# Patient Record
Sex: FEMALE | Race: WHITE | NOT HISPANIC OR LATINO
[De-identification: names, ages, dates, MRNs, and addresses within clinical notes are randomized per-mention and may not be internally consistent; named-entity substitution may affect disease eponyms.]

---

## 2017-09-12 ENCOUNTER — TRANSCRIPTION ENCOUNTER (OUTPATIENT)
Age: 57
End: 2017-09-12

## 2019-07-26 PROBLEM — Z00.00 ENCOUNTER FOR PREVENTIVE HEALTH EXAMINATION: Status: ACTIVE | Noted: 2019-07-26

## 2019-08-21 ENCOUNTER — APPOINTMENT (OUTPATIENT)
Dept: PAIN MANAGEMENT | Facility: CLINIC | Age: 59
End: 2019-08-21
Payer: COMMERCIAL

## 2019-08-21 VITALS
DIASTOLIC BLOOD PRESSURE: 72 MMHG | HEART RATE: 75 BPM | BODY MASS INDEX: 33.13 KG/M2 | SYSTOLIC BLOOD PRESSURE: 107 MMHG | HEIGHT: 62 IN | WEIGHT: 180 LBS

## 2019-08-21 DIAGNOSIS — R51 HEADACHE: ICD-10-CM

## 2019-08-21 PROCEDURE — 99244 OFF/OP CNSLTJ NEW/EST MOD 40: CPT

## 2019-08-21 RX ORDER — METHYLPREDNISOLONE 4 MG/1
4 TABLET ORAL
Qty: 21 | Refills: 0 | Status: ACTIVE | COMMUNITY
Start: 2019-08-21 | End: 1900-01-01

## 2019-08-29 RX ORDER — DEXAMETHASONE 2 MG/1
2 TABLET ORAL
Qty: 6 | Refills: 0 | Status: ACTIVE | COMMUNITY
Start: 2019-08-29 | End: 1900-01-01

## 2019-08-29 NOTE — PHYSICAL EXAM
[General Appearance - Alert] : alert [General Appearance - In No Acute Distress] : in no acute distress [Person] : oriented to person [Time] : oriented to time [Place] : oriented to place [Concentration Intact] : normal concentrating ability [Visual Intact] : visual attention was ~T not ~L decreased [Fluency] : fluency intact [Comprehension] : comprehension intact [Cranial Nerves Optic (II)] : visual acuity intact bilaterally,  visual fields full to confrontation, pupils equal round and reactive to light [Past History] : adequate knowledge of personal past history [Cranial Nerves Oculomotor (III)] : extraocular motion intact [Cranial Nerves Trigeminal (V)] : facial sensation intact symmetrically [Cranial Nerves Glossopharyngeal (IX)] : tongue and palate midline [Cranial Nerves Facial (VII)] : face symmetrical [Cranial Nerves Vestibulocochlear (VIII)] : hearing was intact bilaterally [Cranial Nerves Accessory (XI - Cranial And Spinal)] : head turning and shoulder shrug symmetric [Cranial Nerves Hypoglossal (XII)] : there was no tongue deviation with protrusion [Motor Tone] : muscle tone was normal in all four extremities [Motor Strength] : muscle strength was normal in all four extremities [No Muscle Atrophy] : normal bulk in all four extremities [Abnormal Walk] : normal gait [Sensation Tactile Decrease] : light touch was intact [Balance] : balance was intact [2+] : Ankle jerk left 2+ [Outer Ear] : the ears and nose were normal in appearance [Sclera] : the sclera and conjunctiva were normal [Neck Appearance] : the appearance of the neck was normal [] : no rash [Past-pointing] : there was no past-pointing [Tremor] : no tremor present [Plantar Reflex Right Only] : normal on the right [Plantar Reflex Left Only] : normal on the left [FreeTextEntry1] : cervical paraspinal tenderness.

## 2019-08-29 NOTE — REVIEW OF SYSTEMS
[As Noted in HPI] : as noted in HPI [Negative] : Heme/Lymph [FreeTextEntry2] : No signs of toxicity.

## 2019-08-29 NOTE — PHYSICAL EXAM
[General Appearance - Alert] : alert [General Appearance - In No Acute Distress] : in no acute distress [Person] : oriented to person [Place] : oriented to place [Time] : oriented to time [Visual Intact] : visual attention was ~T not ~L decreased [Concentration Intact] : normal concentrating ability [Comprehension] : comprehension intact [Fluency] : fluency intact [Past History] : adequate knowledge of personal past history [Cranial Nerves Optic (II)] : visual acuity intact bilaterally,  visual fields full to confrontation, pupils equal round and reactive to light [Cranial Nerves Oculomotor (III)] : extraocular motion intact [Cranial Nerves Trigeminal (V)] : facial sensation intact symmetrically [Cranial Nerves Glossopharyngeal (IX)] : tongue and palate midline [Cranial Nerves Facial (VII)] : face symmetrical [Cranial Nerves Vestibulocochlear (VIII)] : hearing was intact bilaterally [Cranial Nerves Hypoglossal (XII)] : there was no tongue deviation with protrusion [Cranial Nerves Accessory (XI - Cranial And Spinal)] : head turning and shoulder shrug symmetric [No Muscle Atrophy] : normal bulk in all four extremities [Motor Strength] : muscle strength was normal in all four extremities [Motor Tone] : muscle tone was normal in all four extremities [Sensation Tactile Decrease] : light touch was intact [Balance] : balance was intact [Abnormal Walk] : normal gait [2+] : Ankle jerk left 2+ [Sclera] : the sclera and conjunctiva were normal [Outer Ear] : the ears and nose were normal in appearance [Neck Appearance] : the appearance of the neck was normal [] : no rash [Past-pointing] : there was no past-pointing [Tremor] : no tremor present [Plantar Reflex Right Only] : normal on the right [Plantar Reflex Left Only] : normal on the left [FreeTextEntry1] : cervical paraspinal tenderness.

## 2019-08-29 NOTE — HISTORY OF PRESENT ILLNESS
[Chronic Headache] : chronic headache [Dizziness] : dizziness [Nausea] : nausea [Photophobia] : photophobia [Neck Pain] : neck pain [Phonophobia] : phonophobia [< 4 hours] : < 4 hours [stayed the same] : stayed the same [FreeTextEntry1] : 58 y/o female presenting with 3 year ho chronic daily headaches. She reports the headaches as "building up in front of head increasing as the day goes on. By end of day pain the pain is intolerable. The pain lasts about 3-60 minutes rarely associated with nausea, photophobia and phonophobia. She also co high pitched tinnitus No LP. ENT eval - CT neg; Neuro - MRI brain w and wo contrast ess neg. The headache is ? positional but it takes her more than 30 minutes to resolve upon lying flat in bed. \par \par Treatment: Currently none. Rx 'd topamax but felt uncomfortable and did not take it. \par \par Patient recalls having a significant stressor around onset of headaches 3 years ago. Sons business fell apart; other son heroin addict. Two years ago, sister dies; Mother is old and almost dead. She has been more withdrawn due to these headaches. \par \par She also recalls having had multiple concussions last one being about 4.5 years ago. \par \par Sleep is disturbed; diet is fair but does not have much appetite; has put on 15 pounds; Cant get things done. Having more anxiety.  \par \par \par Linda Molina- [phone] [Aura] : no aura [Vomiting] : no Vomiting [Scotoma] : no scotoma [Numbness] : no numbness [Tingling] : no tingling [Weakness] : no weakness [Scalp Tenderness] : no scalp tenderness

## 2019-08-29 NOTE — HISTORY OF PRESENT ILLNESS
[Chronic Headache] : chronic headache [Dizziness] : dizziness [Nausea] : nausea [Photophobia] : photophobia [Phonophobia] : phonophobia [Neck Pain] : neck pain [< 4 hours] : < 4 hours [stayed the same] : stayed the same [FreeTextEntry1] : 60 y/o female presenting with 3 year ho chronic daily headaches. She reports the headaches as "building up in front of head increasing as the day goes on. By end of day pain the pain is intolerable. The pain lasts about 3-60 minutes rarely associated with nausea, photophobia and phonophobia. She also co high pitched tinnitus No LP. ENT eval - CT neg; Neuro - MRI brain w and wo contrast ess neg. The headache is ? positional but it takes her more than 30 minutes to resolve upon lying flat in bed. \par \par Treatment: Currently none. Rx 'd topamax but felt uncomfortable and did not take it. \par \par Patient recalls having a significant stressor around onset of headaches 3 years ago. Sons business fell apart; other son heroin addict. Two years ago, sister dies; Mother is old and almost dead. She has been more withdrawn due to these headaches. \par \par She also recalls having had multiple concussions last one being about 4.5 years ago. \par \par Sleep is disturbed; diet is fair but does not have much appetite; has put on 15 pounds; Cant get things done. Having more anxiety.  \par \par \par Linda Molina- [phone] [Aura] : no aura [Vomiting] : no Vomiting [Scotoma] : no scotoma [Tingling] : no tingling [Numbness] : no numbness [Weakness] : no weakness [Scalp Tenderness] : no scalp tenderness

## 2019-08-29 NOTE — ASSESSMENT
[FreeTextEntry1] : Possible chronic migraine\par Possible Low Pressure Headaches as per patients report\par Cervicalgia\par \par I explained in detail that, in my opinion, ther headaches represent chronic migraine. I do agree there is a possible component but not classical for LPH. Her neuro exam is non focal and her MRI brain and c spine are essentially negative. We discussed treating it as a migraine but she prefers to ro LPH. I advised her the MRI brain neg but we could do a LP. She prefers to get LP first prior to any chronic treatment. I also provided her a Rx Medrol dose ayad. Advised of potential risks and benefits from this agent.

## 2019-12-12 ENCOUNTER — APPOINTMENT (OUTPATIENT)
Dept: PAIN MANAGEMENT | Facility: CLINIC | Age: 59
End: 2019-12-12
Payer: COMMERCIAL

## 2019-12-12 VITALS
DIASTOLIC BLOOD PRESSURE: 78 MMHG | SYSTOLIC BLOOD PRESSURE: 123 MMHG | WEIGHT: 180 LBS | HEIGHT: 62 IN | HEART RATE: 84 BPM | BODY MASS INDEX: 33.13 KG/M2

## 2019-12-12 PROCEDURE — 99214 OFFICE O/P EST MOD 30 MIN: CPT

## 2019-12-20 NOTE — ASSESSMENT
[FreeTextEntry1] : Possible chronic migraine\par Possible Low Pressure Headaches as per patients report\par Cervicalgia\par \par Agree with BOTOX trial \par Despite most probably migraine I do agree there is a possible component but not classical for LPH.\par \par

## 2019-12-20 NOTE — PHYSICAL EXAM
[General Appearance - In No Acute Distress] : in no acute distress [General Appearance - Alert] : alert [Person] : oriented to person [Concentration Intact] : normal concentrating ability [Time] : oriented to time [Place] : oriented to place [Fluency] : fluency intact [Visual Intact] : visual attention was ~T not ~L decreased [Comprehension] : comprehension intact [Past History] : adequate knowledge of personal past history [Cranial Nerves Optic (II)] : visual acuity intact bilaterally,  visual fields full to confrontation, pupils equal round and reactive to light [Cranial Nerves Oculomotor (III)] : extraocular motion intact [Cranial Nerves Facial (VII)] : face symmetrical [Cranial Nerves Vestibulocochlear (VIII)] : hearing was intact bilaterally [Cranial Nerves Trigeminal (V)] : facial sensation intact symmetrically [Cranial Nerves Accessory (XI - Cranial And Spinal)] : head turning and shoulder shrug symmetric [Cranial Nerves Glossopharyngeal (IX)] : tongue and palate midline [Cranial Nerves Hypoglossal (XII)] : there was no tongue deviation with protrusion [Motor Tone] : muscle tone was normal in all four extremities [Motor Strength] : muscle strength was normal in all four extremities [Sensation Tactile Decrease] : light touch was intact [No Muscle Atrophy] : normal bulk in all four extremities [Abnormal Walk] : normal gait [Balance] : balance was intact [2+] : Ankle jerk left 2+ [Sclera] : the sclera and conjunctiva were normal [Outer Ear] : the ears and nose were normal in appearance [Neck Appearance] : the appearance of the neck was normal [] : no rash [Past-pointing] : there was no past-pointing [Tremor] : no tremor present [Plantar Reflex Right Only] : normal on the right [Plantar Reflex Left Only] : normal on the left [FreeTextEntry1] : cervical paraspinal tenderness.

## 2019-12-20 NOTE — HISTORY OF PRESENT ILLNESS
[FreeTextEntry1] : Since last visit, patient did not have blood patch LP due to logistics. She is awaiting for BOTOX appproval with Dr. Molina. The headaches have shifted lower but continues to co positional headache almost gone upon lying supine and worse upon standing..

## 2020-01-27 ENCOUNTER — APPOINTMENT (OUTPATIENT)
Dept: PAIN MANAGEMENT | Facility: CLINIC | Age: 60
End: 2020-01-27

## 2022-06-13 ENCOUNTER — LABORATORY RESULT (OUTPATIENT)
Age: 62
End: 2022-06-13

## 2022-06-14 ENCOUNTER — APPOINTMENT (OUTPATIENT)
Dept: DERMATOLOGY | Facility: CLINIC | Age: 62
End: 2022-06-14
Payer: COMMERCIAL

## 2022-06-14 DIAGNOSIS — Z12.83 ENCOUNTER FOR SCREENING FOR MALIGNANT NEOPLASM OF SKIN: ICD-10-CM

## 2022-06-14 DIAGNOSIS — D48.7 NEOPLASM OF UNCERTAIN BEHAVIOR OF OTHER SPECIFIED SITES: ICD-10-CM

## 2022-06-14 DIAGNOSIS — D48.5 NEOPLASM OF UNCERTAIN BEHAVIOR OF SKIN: ICD-10-CM

## 2022-06-14 DIAGNOSIS — D22.9 MELANOCYTIC NEVI, UNSPECIFIED: ICD-10-CM

## 2022-06-14 PROCEDURE — 99203 OFFICE O/P NEW LOW 30 MIN: CPT | Mod: 25

## 2022-06-14 PROCEDURE — 11102 TANGNTL BX SKIN SINGLE LES: CPT

## 2022-06-16 ENCOUNTER — APPOINTMENT (OUTPATIENT)
Dept: DERMATOLOGY | Facility: CLINIC | Age: 62
End: 2022-06-16

## 2022-07-20 ENCOUNTER — NON-APPOINTMENT (OUTPATIENT)
Age: 62
End: 2022-07-20

## 2022-08-10 ENCOUNTER — APPOINTMENT (OUTPATIENT)
Dept: DERMATOLOGY | Facility: CLINIC | Age: 62
End: 2022-08-10

## 2022-08-10 DIAGNOSIS — C44.319 BASAL CELL CARCINOMA OF SKIN OF OTHER PARTS OF FACE: ICD-10-CM

## 2022-08-10 PROCEDURE — 99214 OFFICE O/P EST MOD 30 MIN: CPT

## 2022-08-10 NOTE — HISTORY OF PRESENT ILLNESS
[FreeTextEntry1] : Mohs surgery consultation for a nodular BCC on the left medial cheek [de-identified] : 08/10/2022 \par \par Referred by: Dr. Chaney\par \par Ms. THEO MAYNARD is a 62 year old F who presents for Mohs surgery consultation for a nodular BCC on the left medial cheek. \par The lesion has been present for several months and has been growing slowly. \par She was unhappy with the scar post-biopsy and would like it removed. \par \par Social History: Of [place] descent, born in [place]\par \par Blood thinners: None\par Allergies: NKDA\par Current tobacco\par

## 2022-08-10 NOTE — PHYSICAL EXAM
[Alert] : alert [Oriented x 3] : ~L oriented x 3 [Well Nourished] : well nourished [Conjunctiva Non-injected] : conjunctiva non-injected [No Visual Lymphadenopathy] : no visual  lymphadenopathy [No Clubbing] : no clubbing [No Edema] : no edema [No Bromhidrosis] : no bromhidrosis [No Chromhidrosis] : no chromhidrosis [FreeTextEntry3] : -- Left medial cheek with a 1.2cm sclerotic pearly plaque and adjacent atrophic bx site, just below nasojugal fold

## 2025-04-03 ENCOUNTER — NON-APPOINTMENT (OUTPATIENT)
Age: 65
End: 2025-04-03

## 2025-04-03 ENCOUNTER — APPOINTMENT (OUTPATIENT)
Dept: OPHTHALMOLOGY | Facility: CLINIC | Age: 65
End: 2025-04-03
Payer: MEDICARE

## 2025-04-03 PROCEDURE — 92004 COMPRE OPH EXAM NEW PT 1/>: CPT
